# Patient Record
Sex: MALE | Race: WHITE
[De-identification: names, ages, dates, MRNs, and addresses within clinical notes are randomized per-mention and may not be internally consistent; named-entity substitution may affect disease eponyms.]

---

## 2018-06-29 ENCOUNTER — HOSPITAL ENCOUNTER (EMERGENCY)
Dept: HOSPITAL 92 - ERS | Age: 73
LOS: 1 days | Discharge: HOME | End: 2018-06-30
Payer: MEDICARE

## 2018-06-29 DIAGNOSIS — F17.210: ICD-10-CM

## 2018-06-29 DIAGNOSIS — R53.1: ICD-10-CM

## 2018-06-29 DIAGNOSIS — Z79.899: ICD-10-CM

## 2018-06-29 DIAGNOSIS — N40.0: ICD-10-CM

## 2018-06-29 DIAGNOSIS — R42: Primary | ICD-10-CM

## 2018-06-29 DIAGNOSIS — I10: ICD-10-CM

## 2018-06-29 DIAGNOSIS — T42.8X5A: ICD-10-CM

## 2018-06-29 DIAGNOSIS — E78.5: ICD-10-CM

## 2018-06-29 PROCEDURE — 85025 COMPLETE CBC W/AUTO DIFF WBC: CPT

## 2018-06-29 PROCEDURE — 93005 ELECTROCARDIOGRAM TRACING: CPT

## 2018-06-29 PROCEDURE — 80053 COMPREHEN METABOLIC PANEL: CPT

## 2018-06-29 PROCEDURE — 36415 COLL VENOUS BLD VENIPUNCTURE: CPT

## 2018-06-29 PROCEDURE — 70450 CT HEAD/BRAIN W/O DYE: CPT

## 2018-06-29 PROCEDURE — 82553 CREATINE MB FRACTION: CPT

## 2018-06-29 PROCEDURE — 96360 HYDRATION IV INFUSION INIT: CPT

## 2018-06-29 PROCEDURE — 84484 ASSAY OF TROPONIN QUANT: CPT

## 2018-06-30 LAB
ALBUMIN SERPL BCG-MCNC: 3 G/DL (ref 3.4–4.8)
ALP SERPL-CCNC: 64 U/L (ref 40–150)
ALT SERPL W P-5'-P-CCNC: (no result) U/L (ref 8–55)
ANION GAP SERPL CALC-SCNC: 9 MMOL/L (ref 10–20)
AST SERPL-CCNC: 20 U/L (ref 5–34)
BASOPHILS # BLD AUTO: 0.1 THOU/UL (ref 0–0.2)
BASOPHILS NFR BLD AUTO: 1.5 % (ref 0–1)
BILIRUB SERPL-MCNC: 0.6 MG/DL (ref 0.2–1.2)
BUN SERPL-MCNC: 35 MG/DL (ref 8.4–25.7)
CALCIUM SERPL-MCNC: 8.6 MG/DL (ref 7.8–10.44)
CHLORIDE SERPL-SCNC: 112 MMOL/L (ref 98–107)
CK MB SERPL-MCNC: 0.9 NG/ML (ref 0–6.6)
CK SERPL-CCNC: 123 U/L (ref 30–200)
CO2 SERPL-SCNC: 24 MMOL/L (ref 23–31)
CREAT CL PREDICTED SERPL C-G-VRATE: 0 ML/MIN (ref 70–130)
EOSINOPHIL # BLD AUTO: 0.8 THOU/UL (ref 0–0.7)
EOSINOPHIL NFR BLD AUTO: 17.4 % (ref 0–10)
GLOBULIN SER CALC-MCNC: 2.7 G/DL (ref 2.4–3.5)
GLUCOSE SERPL-MCNC: 116 MG/DL (ref 83–110)
HGB BLD-MCNC: 11.3 G/DL (ref 14–18)
LYMPHOCYTES # BLD: 1.5 THOU/UL (ref 1.2–3.4)
LYMPHOCYTES NFR BLD AUTO: 32.1 % (ref 21–51)
MCH RBC QN AUTO: 34.2 PG (ref 27–31)
MCV RBC AUTO: 97.2 FL (ref 78–98)
MONOCYTES # BLD AUTO: 0.5 THOU/UL (ref 0.11–0.59)
MONOCYTES NFR BLD AUTO: 9.8 % (ref 0–10)
NEUTROPHILS # BLD AUTO: 1.8 THOU/UL (ref 1.4–6.5)
NEUTROPHILS NFR BLD AUTO: 39.1 % (ref 42–75)
PLATELET # BLD AUTO: 76 THOU/UL (ref 130–400)
POTASSIUM SERPL-SCNC: 4.2 MMOL/L (ref 3.5–5.1)
RBC # BLD AUTO: 3.32 MILL/UL (ref 4.7–6.1)
SODIUM SERPL-SCNC: 141 MMOL/L (ref 136–145)
TROPONIN I SERPL DL<=0.01 NG/ML-MCNC: (no result) NG/ML (ref ?–0.03)
WBC # BLD AUTO: 4.6 THOU/UL (ref 4.8–10.8)

## 2018-06-30 NOTE — CT
PRELIMINARY REPORT/[facility]/EMERGENCY AFTER

HOURS PROCEDURE: 

 

 

EXAM:

CT Head Without Intravenous Contrast

 

CLINICAL HISTORY:

72 years old, male; Signs and symptoms; Altered mental status/memory loss and dizziness; Confusion or
 disorientation; Patient HX: Er 6; M72 presents to ed C/O dizziness, onset 2300 friday. Family notes 
pt does "not feel normal. " associated with feeling tired, l sided numbness, dry mouth,

speech changes. Denies HA, sore throat, rhinorrhea, cough, n/v/d. Pt was recently given RX for muscle
 relaxant and took first tab tonight.

 

TECHNIQUE:

Axial computed tomography images of the head/brain without intravenous contrast.

 

COMPARISON:

No relevant prior studies available.

 

FINDINGS:

No definite acute skull fracture.

Included paranasal sinuses are essentially clear.

No acute intracranial hemorrhage or mass effect.

Ventricle size is normal for age.

There is mild, relatively symmetrical decreased attenuation in the periventricular white matter, like
ly

from microvascular disease.

There is a small old lacunar infarct in the right basal ganglia region.

No definite acute infarct by CT.

MRI could be more sensitive/specific for an acute infarct if clinically indicated.

 

 

IMPRESSION:

No acute intracranial bleed or mass effect.

Changes of microvascular disease, and old right lacunar infarct.

No definite acute infarct by CT, see above.

 

Thank you for allowing us to participate in the care of your patient.

Dictated and Authenticated by: Luis Schaefer MD

06/30/2018 2:34 AM Central Time ([place] & [place])

 

 

 

FINAL REPORT

CT HEAD WITHOUT CONTRAST:

 

Date:  06/30/18 

 

Multiple axial tomograms obtained through the head without IV enhancement. 

 

Indication is as noted on the preliminary report. 

 

No acute abnormality identified. 

 

I am in agreement with the preliminary report issued by [facility]. 

 

 

 

 

POS: [facility]

## 2018-06-30 NOTE — EKG
Test Reason : 

Blood Pressure : ***/*** mmHG

Vent. Rate : 054 BPM     Atrial Rate : 054 BPM

   P-R Int : 294 ms          QRS Dur : 134 ms

    QT Int : 466 ms       P-R-T Axes : 000 -12 012 degrees

   QTc Int : 441 ms

 

Sinus bradycardia with 1st degree A-V block

Right bundle branch block

Minimal voltage criteria for LVH, may be normal variant

Abnormal ECG

 

Confirmed by GENARO MOSLEY, MELANI ([id number]),  JANENE LANDIN ([id number]) on 6/30/2018 1:02:49 PM

 

Referred By:             Confirmed By:MELANI LAM MD